# Patient Record
Sex: FEMALE | Race: WHITE
[De-identification: names, ages, dates, MRNs, and addresses within clinical notes are randomized per-mention and may not be internally consistent; named-entity substitution may affect disease eponyms.]

---

## 2018-08-30 ENCOUNTER — HOSPITAL ENCOUNTER (EMERGENCY)
Dept: HOSPITAL 80 - CED | Age: 43
Discharge: HOME | End: 2018-08-30
Payer: COMMERCIAL

## 2018-08-30 VITALS — SYSTOLIC BLOOD PRESSURE: 152 MMHG | DIASTOLIC BLOOD PRESSURE: 88 MMHG

## 2018-08-30 DIAGNOSIS — R07.1: Primary | ICD-10-CM

## 2018-08-30 NOTE — CPEKG
Test Reason : OPEN

Blood Pressure : ***/*** mmHG

Vent. Rate : 083 BPM     Atrial Rate : 082 BPM

   P-R Int : 127 ms          QRS Dur : 093 ms

    QT Int : 371 ms       P-R-T Axes : 055 070 033 degrees

   QTc Int : 436 ms

 

Sinus rhythm

 

Confirmed by Shameka Bustamante ([id number]) on 8/30/2018 12:59:41 AM

 

Referred By:             Confirmed By:Shameka Bustamante

## 2018-08-30 NOTE — EDPHY
H & P


Stated Complaint: chest pain


Time Seen by Provider: 08/30/18 00:36


HPI/ROS: 





42 yo F presents c/o left upper chest pain that began at 830 pm and has 

continued , worse with deep inspiration.


Also with some mild shortness of breath. She rates the pain 3/10, sometimes the 

pain is sharp and 7/10.


No nausea, no fever or chills, no recent illnesses, no cough.


On oral contraceptives, no smoking, no prolonged travel, no leg cramps.











Review of systems


As per HPI


General no fever no chills no weakness


HEENT no eye pain no eye discharge. No eye redness, no sore throat


Respiratory no cough, pos shortness of breath


Cardiac pos chest pain, no peripheral edema


GI no abdominal pain, no diarrhea, no constipation, no nausea, no vomiting


  no flank pain, no hematuria, no dysuria


Musculoskeletal no myalgias, no joint pain


Heme  no easy bruising, no easy bleeding


Endo no polyuria, no polydipsia


Skin no rashes, no pruritus


Neuro no syncope, no dizziness, no headaches


Psych is no suicidal ideation, no homicidal ideation





Source: Patient


Exam Limitations: No limitations





- Personal History


LMP (Females 10-55): Unknown


Current Tetanus Diphtheria and Acellular Pertussis (TDAP): Yes


Tetanus Vaccine Date: WITHIN 10 YRS





- Medical/Surgical History


Hx Asthma: No


Hx Chronic Respiratory Disease: No


Hx Diabetes: No


Hx Cardiac Disease: No


Hx Renal Disease: No


Hx Cirrhosis: No


Hx Alcoholism: No


Hx HIV/AIDS: No


Hx Splenectomy or Spleen Trauma: No


Other PMH: THYROID NODULES, RECENT TESTING FOR MS HAS BEEN NORMAL





- Family History


Significant Family History: No pertinent family hx





- Social History


Smoking Status: Never smoked


Alcohol Use: None


Drug Use: None





- Physical Exam


Exam: 





43-year-old female alert and oriented no acute distress nontoxic appearance, 

afebrile


HEENT atraumatic normocephalic, extraocular muscles intact, anicteric


Oropharynx negative for erythema negative exudate, tolerating her own secretions


Neck supple no meningismus


Lungs clear to auscultation bilaterally


Heart regular rate and rhythm without murmur rub or gallop


Abdomen nondistended normoactive bowel sounds soft nontender


Back no CVA tenderness, no step-offs, no spinal tenderness


Extremities no cyanosis clubbing or edema


Neuro alert and oriented, no focal deficits


Constitutional: 


 Initial Vital Signs











Temperature (C)  36.6 C   08/30/18 00:35


 


Heart Rate  80   08/30/18 00:35


 


Respiratory Rate  20   08/30/18 00:35


 


Blood Pressure  143/100 H  08/30/18 00:35


 


O2 Sat (%)  100   08/30/18 00:35








 











O2 Delivery Mode               Room Air














Allergies/Adverse Reactions: 


 





No Known Allergies Allergy (Verified 03/15/14 22:26)


 








Home Medications: 














 Medication  Instructions  Recorded


 


Miscellaneous Medical Supply [NO 1 ea MISC AD 12/01/12





HOME MEDS]  














Medical Decision Making





- Diagnostics


EKG Interpretation: 





Normal sinus rhythm rate 83, no ischemic changes, CO interval 127, QT interval 

371


ED Course/Re-evaluation: 





Patient seen and evaluated for left upper chest pain


IV established, lab sent


EKG normal sinus rhythm


Troponin 0


D-dimer neg


CBC wnl, mild anemia, hgb 12.5


CMP wnl





Chest x-ray neg








Imp


atypical chest pain





Plan


dc home


f/u pcp


Differential Diagnosis: 





Differential diagnosis considered but not limited to:


Myocardial infarction, pneumothorax, pleural effusion, pulmonary embolus, 

pneumonia, costochondritis





- Data Points


Laboratory Results: 


 











  08/30/18 08/30/18





  00:52 00:49


 


POC Sodium  134 mEq/L L mEq/L  





   (135-145)  


 


POC Potassium  4.0 mEq/L mEq/L  





   (3.3-5.0)  


 


POC Chloride  109.0 mEq/L mEq/L  





   ()  


 


POC Total CO2  28 mEq/L mEq/L  





   (22-31)  


 


POC BUN  8 mg/dL mg/dL  





   (7-23)  


 


POC Creatinine  0.9 mg/dL mg/dL  





   (0.6-1.0)  


 


POC Glucose  104 mg/dL H mg/dL  





   ()  


 


POC Calcium  9.0 mg/dL mg/dL  





   (8.5-10.4)  


 


POC Total Bilirubin  0.7 mg/dL mg/dL  





   (0.1-1.4)  


 


POC AST  19 IU/L IU/L  





   (14-46)  


 


POC ALT  14 IU/L IU/L  





   (9-52)  


 


POC Alk Phosphatase  54 IU/L IU/L  





   ()  


 


POC Troponin I    0.00 ng/mL ng/mL





    (0.00-0.08) 


 


POC Total Protein  7.0 g/dL g/dL  





   (6.3-8.2)  


 


POC Albumin  3.4 g/dL L g/dL  





   (3.5-5.0)  











Point of Care Test Results: 


 CBC











CBC Collection Date            08/30/18


 


CBC Collection Time            00:45


 


WBC                            6.7


 


RBC                            4.68


 


HGB                            12.5


 


HCT                            39.0


 


PLT                            268


 


Neut #                         3.9


 


Neut                           58.3


 


LYMPH #                        2.2


 


LYMPH                          33.4


 


Other WBC #                    0.6


 


Other WBC                      8.3


 


MCV                            83.3











 Chemistry











  08/30/18 08/30/18





  00:52 00:49


 


POC Sodium  134 mEq/L L mEq/L  





   (135-145)  


 


POC Potassium  4.0 mEq/L mEq/L  





   (3.3-5.0)  


 


POC Chloride  109.0 mEq/L mEq/L  





   ()  


 


POC Total CO2  28 mEq/L mEq/L  





   (22-31)  


 


POC BUN  8 mg/dL mg/dL  





   (7-23)  


 


POC Creatinine  0.9 mg/dL mg/dL  





   (0.6-1.0)  


 


POC Glucose  104 mg/dL H mg/dL  





   ()  


 


POC Calcium  9.0 mg/dL mg/dL  





   (8.5-10.4)  


 


POC Total Bilirubin  0.7 mg/dL mg/dL  





   (0.1-1.4)  


 


POC AST  19 IU/L IU/L  





   (14-46)  


 


POC ALT  14 IU/L IU/L  





   (9-52)  


 


POC Alk Phosphatase  54 IU/L IU/L  





   ()  


 


POC Troponin I    0.00 ng/mL ng/mL





    (0.00-0.08) 


 


POC Total Protein  7.0 g/dL g/dL  





   (6.3-8.2)  


 


POC Albumin  3.4 g/dL L g/dL  





   (3.5-5.0)  








 D-Dimer











D-Dimer Collection Date        08/30/18


 


D-Dimer Collection Time        12:45


 


D-Dimer (ng/ml)                <100

















Departure





- Departure


Disposition: Home, Routine, Self-Care


Clinical Impression: 


 Chest pain, atypical





Condition: Good


Instructions:  Noncardiac Chest Pain (ED), Shortness of Breath (ED)


Referrals: 


Patient,NotPresent [Primary Care Provider] - As per Instructions